# Patient Record
Sex: FEMALE | Race: WHITE | NOT HISPANIC OR LATINO | Employment: FULL TIME | URBAN - NONMETROPOLITAN AREA
[De-identification: names, ages, dates, MRNs, and addresses within clinical notes are randomized per-mention and may not be internally consistent; named-entity substitution may affect disease eponyms.]

---

## 2017-07-20 ENCOUNTER — TRANSCRIBE ORDERS (OUTPATIENT)
Dept: ADMINISTRATIVE | Facility: HOSPITAL | Age: 62
End: 2017-07-20

## 2017-07-20 DIAGNOSIS — Z85.3 HX OF BREAST CANCER: Primary | ICD-10-CM

## 2017-08-10 ENCOUNTER — HOSPITAL ENCOUNTER (OUTPATIENT)
Dept: MAMMOGRAPHY | Facility: HOSPITAL | Age: 62
Discharge: HOME | End: 2017-08-10
Attending: INTERNAL MEDICINE | Admitting: INTERNAL MEDICINE

## 2017-08-10 DIAGNOSIS — Z85.3 HX OF BREAST CANCER: ICD-10-CM

## 2017-08-10 PROCEDURE — 77063 BREAST TOMOSYNTHESIS BI: CPT | Performed by: RADIOLOGY

## 2017-08-10 PROCEDURE — 77063 BREAST TOMOSYNTHESIS BI: CPT

## 2017-08-10 PROCEDURE — 77067 SCR MAMMO BI INCL CAD: CPT | Performed by: RADIOLOGY

## 2017-08-10 PROCEDURE — G0202 SCR MAMMO BI INCL CAD: HCPCS

## 2017-08-17 ENCOUNTER — HOSPITAL ENCOUNTER (OUTPATIENT)
Dept: ULTRASOUND IMAGING | Facility: HOSPITAL | Age: 62
Discharge: HOME | End: 2017-08-17
Attending: RADIOLOGY | Admitting: RADIOLOGY

## 2017-08-17 DIAGNOSIS — R92.8 ABNORMAL MAMMOGRAM: ICD-10-CM

## 2017-08-17 PROCEDURE — 76642 ULTRASOUND BREAST LIMITED: CPT | Performed by: RADIOLOGY

## 2017-08-17 PROCEDURE — 76642 ULTRASOUND BREAST LIMITED: CPT

## 2018-11-02 ENCOUNTER — HOSPITAL ENCOUNTER (OUTPATIENT)
Dept: MAMMOGRAPHY | Facility: HOSPITAL | Age: 63
Discharge: HOME | End: 2018-11-02
Admitting: INTERNAL MEDICINE

## 2018-11-02 DIAGNOSIS — Z12.39 SCREENING BREAST EXAMINATION: ICD-10-CM

## 2018-11-02 PROCEDURE — 77063 BREAST TOMOSYNTHESIS BI: CPT | Performed by: RADIOLOGY

## 2018-11-02 PROCEDURE — 77063 BREAST TOMOSYNTHESIS BI: CPT

## 2018-11-02 PROCEDURE — 77067 SCR MAMMO BI INCL CAD: CPT

## 2018-11-02 PROCEDURE — 77067 SCR MAMMO BI INCL CAD: CPT | Performed by: RADIOLOGY

## 2018-11-07 ENCOUNTER — HOSPITAL ENCOUNTER (OUTPATIENT)
Dept: MAMMOGRAPHY | Facility: HOSPITAL | Age: 63
Discharge: HOME | End: 2018-11-07
Admitting: RADIOLOGY

## 2018-11-07 ENCOUNTER — HOSPITAL ENCOUNTER (OUTPATIENT)
Dept: GENERAL RADIOLOGY | Facility: HOSPITAL | Age: 63
Discharge: HOME | End: 2018-11-07
Attending: INTERNAL MEDICINE

## 2018-11-07 ENCOUNTER — HOSPITAL ENCOUNTER (OUTPATIENT)
Dept: BONE DENSITY | Facility: HOSPITAL | Age: 63
Discharge: HOME | End: 2018-11-07

## 2018-11-07 ENCOUNTER — TRANSCRIBE ORDERS (OUTPATIENT)
Dept: ADMINISTRATIVE | Facility: HOSPITAL | Age: 63
End: 2018-11-07

## 2018-11-07 DIAGNOSIS — Z90.710 POST HYSTERECTOMY MENOPAUSE: ICD-10-CM

## 2018-11-07 DIAGNOSIS — M25.552 LEFT HIP PAIN: Primary | ICD-10-CM

## 2018-11-07 DIAGNOSIS — M25.552 LEFT HIP PAIN: ICD-10-CM

## 2018-11-07 DIAGNOSIS — Z92.89 HX OF MAMMOGRAM: ICD-10-CM

## 2018-11-07 DIAGNOSIS — E89.40 POST HYSTERECTOMY MENOPAUSE: ICD-10-CM

## 2018-11-07 PROCEDURE — 73503 X-RAY EXAM HIP UNI 4/> VIEWS: CPT | Performed by: RADIOLOGY

## 2018-11-07 PROCEDURE — 77080 DXA BONE DENSITY AXIAL: CPT | Performed by: RADIOLOGY

## 2018-11-07 PROCEDURE — 73503 X-RAY EXAM HIP UNI 4/> VIEWS: CPT

## 2018-11-07 PROCEDURE — 77080 DXA BONE DENSITY AXIAL: CPT

## 2018-11-16 ENCOUNTER — APPOINTMENT (OUTPATIENT)
Dept: BONE DENSITY | Facility: HOSPITAL | Age: 63
End: 2018-11-16

## 2019-12-13 ENCOUNTER — HOSPITAL ENCOUNTER (OUTPATIENT)
Dept: MAMMOGRAPHY | Facility: HOSPITAL | Age: 64
Discharge: HOME | End: 2019-12-13
Admitting: INTERNAL MEDICINE

## 2019-12-13 DIAGNOSIS — Z12.31 VISIT FOR SCREENING MAMMOGRAM: ICD-10-CM

## 2019-12-13 PROCEDURE — 77067 SCR MAMMO BI INCL CAD: CPT

## 2019-12-13 PROCEDURE — 77067 SCR MAMMO BI INCL CAD: CPT | Performed by: RADIOLOGY

## 2019-12-13 PROCEDURE — 77063 BREAST TOMOSYNTHESIS BI: CPT | Performed by: RADIOLOGY

## 2019-12-13 PROCEDURE — 77063 BREAST TOMOSYNTHESIS BI: CPT

## 2020-10-02 ENCOUNTER — APPOINTMENT (OUTPATIENT)
Dept: MAMMOGRAPHY | Facility: HOSPITAL | Age: 65
End: 2020-10-02

## 2020-10-02 ENCOUNTER — HOSPITAL ENCOUNTER (OUTPATIENT)
Dept: BONE DENSITY | Facility: HOSPITAL | Age: 65
End: 2020-10-02

## 2020-12-14 ENCOUNTER — HOSPITAL ENCOUNTER (OUTPATIENT)
Dept: BONE DENSITY | Facility: HOSPITAL | Age: 65
Discharge: HOME | End: 2020-12-14

## 2020-12-14 ENCOUNTER — HOSPITAL ENCOUNTER (OUTPATIENT)
Dept: MAMMOGRAPHY | Facility: HOSPITAL | Age: 65
Discharge: HOME | End: 2020-12-14

## 2020-12-14 DIAGNOSIS — Z12.31 VISIT FOR SCREENING MAMMOGRAM: ICD-10-CM

## 2020-12-14 DIAGNOSIS — M81.0 AGE-RELATED OSTEOPOROSIS WITHOUT CURRENT PATHOLOGICAL FRACTURE: ICD-10-CM

## 2020-12-14 PROCEDURE — 77080 DXA BONE DENSITY AXIAL: CPT | Performed by: RADIOLOGY

## 2020-12-14 PROCEDURE — 77063 BREAST TOMOSYNTHESIS BI: CPT

## 2020-12-14 PROCEDURE — 77067 SCR MAMMO BI INCL CAD: CPT

## 2020-12-14 PROCEDURE — 77063 BREAST TOMOSYNTHESIS BI: CPT | Performed by: RADIOLOGY

## 2020-12-14 PROCEDURE — 77080 DXA BONE DENSITY AXIAL: CPT

## 2020-12-14 PROCEDURE — 77067 SCR MAMMO BI INCL CAD: CPT | Performed by: RADIOLOGY

## 2021-02-18 ENCOUNTER — IMMUNIZATION (OUTPATIENT)
Dept: VACCINE CLINIC | Facility: HOSPITAL | Age: 66
End: 2021-02-18

## 2021-02-18 PROCEDURE — 91300 HC SARSCOV02 VAC 30MCG/0.3ML IM: CPT | Performed by: INTERNAL MEDICINE

## 2021-02-18 PROCEDURE — 0001A: CPT | Performed by: INTERNAL MEDICINE

## 2021-03-11 ENCOUNTER — IMMUNIZATION (OUTPATIENT)
Dept: VACCINE CLINIC | Facility: HOSPITAL | Age: 66
End: 2021-03-11

## 2021-03-11 PROCEDURE — 0002A: CPT | Performed by: INTERNAL MEDICINE

## 2021-03-11 PROCEDURE — 91300 HC SARSCOV02 VAC 30MCG/0.3ML IM: CPT | Performed by: INTERNAL MEDICINE

## 2021-09-28 ENCOUNTER — IMMUNIZATION (OUTPATIENT)
Dept: VACCINE CLINIC | Facility: HOSPITAL | Age: 66
End: 2021-09-28

## 2021-09-28 PROCEDURE — 0003A: CPT | Performed by: INTERNAL MEDICINE

## 2021-09-28 PROCEDURE — 91300 HC SARSCOV02 VAC 30MCG/0.3ML IM: CPT | Performed by: INTERNAL MEDICINE

## 2022-01-20 ENCOUNTER — HOSPITAL ENCOUNTER (OUTPATIENT)
Dept: MAMMOGRAPHY | Facility: HOSPITAL | Age: 67
Discharge: HOME | End: 2022-01-20
Admitting: INTERNAL MEDICINE

## 2022-01-20 DIAGNOSIS — Z12.31 VISIT FOR SCREENING MAMMOGRAM: ICD-10-CM

## 2022-01-20 PROCEDURE — 77067 SCR MAMMO BI INCL CAD: CPT | Performed by: RADIOLOGY

## 2022-01-20 PROCEDURE — 77067 SCR MAMMO BI INCL CAD: CPT

## 2022-01-20 PROCEDURE — 77063 BREAST TOMOSYNTHESIS BI: CPT

## 2022-01-20 PROCEDURE — 77063 BREAST TOMOSYNTHESIS BI: CPT | Performed by: RADIOLOGY

## 2022-10-06 ENCOUNTER — TRANSCRIBE ORDERS (OUTPATIENT)
Dept: ADMINISTRATIVE | Facility: HOSPITAL | Age: 67
End: 2022-10-06

## 2022-10-06 DIAGNOSIS — M81.0 SENILE OSTEOPOROSIS: Primary | ICD-10-CM

## 2022-12-21 ENCOUNTER — HOSPITAL ENCOUNTER (OUTPATIENT)
Dept: BONE DENSITY | Facility: HOSPITAL | Age: 67
Discharge: HOME | End: 2022-12-21
Admitting: INTERNAL MEDICINE

## 2022-12-21 DIAGNOSIS — M81.0 SENILE OSTEOPOROSIS: ICD-10-CM

## 2022-12-21 PROCEDURE — 77080 DXA BONE DENSITY AXIAL: CPT

## 2022-12-21 PROCEDURE — 77080 DXA BONE DENSITY AXIAL: CPT | Performed by: RADIOLOGY

## 2023-01-23 ENCOUNTER — HOSPITAL ENCOUNTER (OUTPATIENT)
Dept: MAMMOGRAPHY | Facility: HOSPITAL | Age: 68
Discharge: HOME | End: 2023-01-23
Admitting: INTERNAL MEDICINE
Payer: MEDICARE

## 2023-01-23 DIAGNOSIS — Z12.31 VISIT FOR SCREENING MAMMOGRAM: ICD-10-CM

## 2023-01-23 PROCEDURE — 77067 SCR MAMMO BI INCL CAD: CPT | Performed by: RADIOLOGY

## 2023-01-23 PROCEDURE — 77063 BREAST TOMOSYNTHESIS BI: CPT

## 2023-01-23 PROCEDURE — 77063 BREAST TOMOSYNTHESIS BI: CPT | Performed by: RADIOLOGY

## 2023-01-23 PROCEDURE — 77067 SCR MAMMO BI INCL CAD: CPT

## 2023-05-24 ENCOUNTER — TRANSCRIBE ORDERS (OUTPATIENT)
Dept: ADMINISTRATIVE | Facility: HOSPITAL | Age: 68
End: 2023-05-24
Payer: MEDICARE

## 2023-05-24 ENCOUNTER — HOSPITAL ENCOUNTER (OUTPATIENT)
Dept: GENERAL RADIOLOGY | Facility: HOSPITAL | Age: 68
Discharge: HOME | End: 2023-05-24
Admitting: INTERNAL MEDICINE
Payer: MEDICARE

## 2023-05-24 DIAGNOSIS — M79.671 RIGHT FOOT PAIN: ICD-10-CM

## 2023-05-24 DIAGNOSIS — M79.671 RIGHT FOOT PAIN: Primary | ICD-10-CM

## 2023-05-24 PROCEDURE — 73630 X-RAY EXAM OF FOOT: CPT | Performed by: RADIOLOGY

## 2023-05-24 PROCEDURE — 73630 X-RAY EXAM OF FOOT: CPT

## 2023-10-09 ENCOUNTER — TRANSCRIBE ORDERS (OUTPATIENT)
Dept: ADMINISTRATIVE | Facility: HOSPITAL | Age: 68
End: 2023-10-09
Payer: MEDICARE

## 2023-10-09 ENCOUNTER — HOSPITAL ENCOUNTER (OUTPATIENT)
Dept: GENERAL RADIOLOGY | Facility: HOSPITAL | Age: 68
Discharge: HOME | End: 2023-10-09
Admitting: INTERNAL MEDICINE
Payer: MEDICARE

## 2023-10-09 DIAGNOSIS — M25.552 CHRONIC LEFT HIP PAIN: Primary | ICD-10-CM

## 2023-10-09 DIAGNOSIS — M25.552 CHRONIC LEFT HIP PAIN: ICD-10-CM

## 2023-10-09 DIAGNOSIS — G89.29 CHRONIC LEFT HIP PAIN: Primary | ICD-10-CM

## 2023-10-09 DIAGNOSIS — G89.29 CHRONIC LEFT HIP PAIN: ICD-10-CM

## 2023-10-09 PROCEDURE — 73523 X-RAY EXAM HIPS BI 5/> VIEWS: CPT

## 2024-08-20 ENCOUNTER — TRANSCRIBE ORDERS (OUTPATIENT)
Dept: ADMINISTRATIVE | Facility: HOSPITAL | Age: 69
End: 2024-08-20
Payer: MEDICARE

## 2024-08-20 DIAGNOSIS — Z12.31 BREAST CANCER SCREENING BY MAMMOGRAM: Primary | ICD-10-CM

## 2024-10-01 ENCOUNTER — HOSPITAL ENCOUNTER (OUTPATIENT)
Facility: HOSPITAL | Age: 69
Discharge: HOME | End: 2024-10-01
Admitting: INTERNAL MEDICINE
Payer: MEDICARE

## 2024-10-01 DIAGNOSIS — Z12.31 BREAST CANCER SCREENING BY MAMMOGRAM: ICD-10-CM

## 2024-10-01 PROCEDURE — 77067 SCR MAMMO BI INCL CAD: CPT

## 2024-10-01 PROCEDURE — 77063 BREAST TOMOSYNTHESIS BI: CPT

## 2024-10-03 ENCOUNTER — HOSPITAL ENCOUNTER (OUTPATIENT)
Dept: MAMMOGRAPHY | Facility: HOSPITAL | Age: 69
Discharge: HOME | End: 2024-10-03
Payer: MEDICARE

## 2024-10-03 ENCOUNTER — HOSPITAL ENCOUNTER (OUTPATIENT)
Dept: ULTRASOUND IMAGING | Facility: HOSPITAL | Age: 69
Discharge: HOME | End: 2024-10-03
Payer: MEDICARE

## 2024-10-03 DIAGNOSIS — R92.8 ABNORMAL MAMMOGRAM: ICD-10-CM

## 2024-10-03 PROCEDURE — G0279 TOMOSYNTHESIS, MAMMO: HCPCS

## 2024-10-03 PROCEDURE — 77065 DX MAMMO INCL CAD UNI: CPT

## 2024-10-03 PROCEDURE — 76642 ULTRASOUND BREAST LIMITED: CPT

## 2024-10-04 ENCOUNTER — TRANSCRIBE ORDERS (OUTPATIENT)
Dept: ADMINISTRATIVE | Facility: HOSPITAL | Age: 69
End: 2024-10-04
Payer: MEDICARE

## 2024-10-04 DIAGNOSIS — Z78.0 ASYMPTOMATIC MENOPAUSAL STATE: Primary | ICD-10-CM

## 2024-10-08 ENCOUNTER — HOSPITAL ENCOUNTER (OUTPATIENT)
Dept: MAMMOGRAPHY | Facility: HOSPITAL | Age: 69
Discharge: HOME | End: 2024-10-08
Payer: MEDICARE

## 2024-10-08 ENCOUNTER — HOSPITAL ENCOUNTER (OUTPATIENT)
Dept: ULTRASOUND IMAGING | Facility: HOSPITAL | Age: 69
Discharge: HOME | End: 2024-10-08
Payer: MEDICARE

## 2024-10-08 DIAGNOSIS — R92.8 ABNORMAL MAMMOGRAM: ICD-10-CM

## 2024-10-08 PROCEDURE — A4648 IMPLANTABLE TISSUE MARKER: HCPCS

## 2024-10-10 LAB
REF LAB TEST METHOD: NORMAL
REF LAB TEST METHOD: NORMAL

## 2024-10-15 ENCOUNTER — TELEPHONE (OUTPATIENT)
Dept: MAMMOGRAPHY | Facility: HOSPITAL | Age: 69
End: 2024-10-15
Payer: MEDICARE

## 2024-10-18 ENCOUNTER — OFFICE VISIT (OUTPATIENT)
Dept: SURGERY | Facility: CLINIC | Age: 69
End: 2024-10-18
Payer: MEDICARE

## 2024-10-18 VITALS
BODY MASS INDEX: 23.71 KG/M2 | SYSTOLIC BLOOD PRESSURE: 136 MMHG | WEIGHT: 133.8 LBS | HEIGHT: 63 IN | DIASTOLIC BLOOD PRESSURE: 72 MMHG | HEART RATE: 72 BPM

## 2024-10-18 DIAGNOSIS — C50.911 MALIGNANT NEOPLASM OF RIGHT BREAST IN FEMALE, ESTROGEN RECEPTOR POSITIVE, UNSPECIFIED SITE OF BREAST: Primary | ICD-10-CM

## 2024-10-18 DIAGNOSIS — Z17.0 MALIGNANT NEOPLASM OF RIGHT BREAST IN FEMALE, ESTROGEN RECEPTOR POSITIVE, UNSPECIFIED SITE OF BREAST: Primary | ICD-10-CM

## 2024-10-18 PROCEDURE — 76642 ULTRASOUND BREAST LIMITED: CPT | Performed by: SURGERY

## 2024-10-18 PROCEDURE — 99204 OFFICE O/P NEW MOD 45 MIN: CPT | Performed by: SURGERY

## 2024-10-18 PROCEDURE — 1159F MED LIST DOCD IN RCRD: CPT | Performed by: SURGERY

## 2024-10-18 PROCEDURE — 1160F RVW MEDS BY RX/DR IN RCRD: CPT | Performed by: SURGERY

## 2024-10-18 RX ORDER — VALACYCLOVIR HYDROCHLORIDE 1 G/1
1000 TABLET, FILM COATED ORAL 3 TIMES DAILY
COMMUNITY
Start: 2024-10-04

## 2024-10-18 RX ORDER — GLUCOSAMINE/CHONDR SU A SOD 750-600 MG
TABLET ORAL
COMMUNITY

## 2024-10-18 RX ORDER — ALENDRONATE SODIUM 70 MG/1
TABLET ORAL
COMMUNITY
Start: 2024-09-30

## 2024-10-18 RX ORDER — IBUPROFEN 800 MG/1
TABLET, FILM COATED ORAL
COMMUNITY
Start: 2024-06-24

## 2024-10-18 NOTE — PROGRESS NOTES
Subjective   Ela Loera is a 69 y.o. female here today for pathology review.    History of Present Illness  Ms. Loera was seen in the office today for her new diagnosis of right breast cancer.  The patient underwent a screening mammogram on 10/1/2024 which demonstrated a focal asymmetry in the right upper outer quadrant.  She underwent a right diagnostic mammogram and right breast ultrasound on 10/3/2024 which demonstrated a 1 cm irregular mass at 10:00 3 cm from the nipple.  A prominent right axillary lymph node was also identified.  Biopsy was performed of both the lymph node and the breast on 10/8/2024 which demonstrated a grade 2 invasive ductal carcinoma, ER positive, [place] positive, HER2 negative.  The patient has a personal history of right breast cancer treated in 1986 with lumpectomy and axillary node dissection.  Patient also states she received 36 radiation treatments.  She did not have chemotherapy.  Patient reports a family history of mother and sister with colon cancer and another sister with non-Hodgkin's lymphoma but no other family members with breast or ovarian cancer  No Known Allergies  Current Outpatient Medications   Medication Sig Dispense Refill    CRANBERRY FRUIT CONCENTRATE PO Take  by mouth.      ibuprofen (ADVIL,MOTRIN) 800 MG tablet       Lutein-Zeaxanthin 25-5 MG capsule       valACYclovir (VALTREX) 1000 MG tablet Take 1 tablet by mouth 3 (Three) Times a Day.      alendronate (FOSAMAX) 70 MG tablet  (Patient not taking: Reported on 10/18/2024)       No current facility-administered medications for this visit.     Past Medical History:   Diagnosis Date    Breast cancer 1986     Past Surgical History:   Procedure Laterality Date    BREAST LUMPECTOMY Right 10/1986    cancer    CATARACT EXTRACTION      NOSE SURGERY         Pertinent Review of Systems:  Respiratory: no shortness of breath  Cardiovascular: no chest pain  Other pertinent:      Objective   /72 (BP Location: Left  "arm)   Pulse 72   Ht 160 cm (63\")   Wt 60.7 kg (133 lb 12.8 oz)   BMI 23.70 kg/m²   Physical Exam  Well-developed well-nourished female  Neck:  No supraclavicular adenopathy  Lungs:  Respiratory effort normal. Auscultation: Clear, without wheezes, rhonchi, rales  Heart:  Regular rate and rhythm, without murmur, gallop, rub.  No pedal edema  Breasts: On visual inspection the right breast is slightly smaller than the left but otherwise there is good conformity.  Examination of the right breast demonstrates some thickening in the 10 o'clock position which may be postbiopsy change.  There is a high palpable right axillary lymph node.  Examination of the left breast demonstrates no discrete mass, skin change, or axillary adenopathy.       Procedures   Limited right breast Ultrasound close axilla    Indication: Evaluation for clip placement    Description: With use of the 12.5 MHz transducer the area of clinical concern was scanned in multiple planes.    Findings: In the 10 o'clock position the tumor and biopsy clip are identifiable in the 10 o'clock position.  High in the right axilla medial under the pectoralis major muscle and an enlarged lymph node is identified.    Impression: Tumor amenable to ultrasound localization    Recommendation:  Followup as clinically indicated  Results/Data:  Imaging: Mammogram and ultrasound reports and images from 10/8/2024, 9/3/2024)/1/24 were reviewed.  I agree with the assessment  Notes:   Lab:   Other: Pathology results were reviewed    Assessment & Plan   Clinical T1 N?  Negative ductal carcinoma, ER positive, [place] positive, HER2 negative    Plan: Given the density of the breast tissue MRI is indicated.  Given the patient's first primary was in her 30s patient was offered genetic testing, particularly if this is her second primary but given her age of 69 and the fact that she does not have any female children, she declined  Patient to return to the office post MRI for surgical " planning         Discussion/Summary: Treatment course is complicated by the patient's lumpectomy and radiation on the right for prior breast cancer.  Traditionally prior radiation excludes lumpectomy as a treatment option but in this case that the tumor is a T1 and lymph nodes are negative then she could meet the criteria to avoid radiation.  The key will be the status of the large right axillary lymph node.  My suspicion is that this will need to be removed and pending the frozen section result will determine whether or not lumpectomy versus mastectomy would need to be done as if the lymph node is positive, she would not meet the criteria to avoid radiation.    Time spent:     BMI is within normal parameters. No other follow-up for BMI required.       Future Appointments   Date Time Provider Department Center   12/23/2024  8:00 AM [facility] DEXA 1 [facility] DX [facility]         This document has been electronically signed by       October 18, 2024 11:54 EDT      Please note that portions of this note were completed with a voice recognition program.

## 2024-10-25 ENCOUNTER — PATIENT OUTREACH (OUTPATIENT)
Dept: ONCOLOGY | Facility: CLINIC | Age: 69
End: 2024-10-25
Payer: MEDICARE

## 2024-10-25 NOTE — SIGNIFICANT NOTE
Called patient on this date. The role of the Nurse Navigator was introduced to patient.  NN addressed illness, understanding, and provided clarification as needed. Time spent talking with patient, empathetic listening provided, and reassurance given. NN contact information was provided and encouraged patient to call with any questions or concerns. Pt. Verbalized understanding and is in agreement with plan of care. NN will follow and assist PRN.

## 2024-10-29 ENCOUNTER — TELEPHONE (OUTPATIENT)
Dept: SURGERY | Facility: CLINIC | Age: 69
End: 2024-10-29
Payer: MEDICARE

## 2024-10-29 NOTE — TELEPHONE ENCOUNTER
Called PCP to ask if might have copy of old pathology report. They do not their records only go back to 2013.

## 2024-10-30 ENCOUNTER — HOSPITAL ENCOUNTER (OUTPATIENT)
Dept: MRI IMAGING | Facility: HOSPITAL | Age: 69
Discharge: HOME | End: 2024-10-30
Admitting: SURGERY
Payer: MEDICARE

## 2024-10-30 DIAGNOSIS — C50.911 MALIGNANT NEOPLASM OF RIGHT BREAST IN FEMALE, ESTROGEN RECEPTOR POSITIVE, UNSPECIFIED SITE OF BREAST: ICD-10-CM

## 2024-10-30 DIAGNOSIS — Z17.0 MALIGNANT NEOPLASM OF RIGHT BREAST IN FEMALE, ESTROGEN RECEPTOR POSITIVE, UNSPECIFIED SITE OF BREAST: ICD-10-CM

## 2024-10-30 LAB — CREAT BLDA-MCNC: 0.5 MG/DL (ref 0.6–1.3)

## 2024-10-30 PROCEDURE — 77049 MRI BREAST C-+ W/CAD BI: CPT | Performed by: RADIOLOGY

## 2024-10-30 PROCEDURE — 0 GADOBENATE DIMEGLUMINE 529 MG/ML SOLUTION: Performed by: SURGERY

## 2024-10-30 PROCEDURE — A9577 INJ MULTIHANCE: HCPCS | Performed by: SURGERY

## 2024-10-30 PROCEDURE — C8906 MRI W/CONT, BREAST,  BI: HCPCS

## 2024-10-30 PROCEDURE — 82565 ASSAY OF CREATININE: CPT

## 2024-10-30 RX ADMIN — GADOBENATE DIMEGLUMINE 12 ML: 529 INJECTION, SOLUTION INTRAVENOUS at 14:41

## 2024-11-07 ENCOUNTER — PATIENT OUTREACH (OUTPATIENT)
Dept: ONCOLOGY | Facility: CLINIC | Age: 69
End: 2024-11-07
Payer: MEDICARE

## 2024-11-07 NOTE — SIGNIFICANT NOTE
NN received a call from patient to discuss her follow up and what to expect. During conversation she informed me that she went for a second opinion with Dr. Kevin Briggs in [place] but has not made any commitment to [place].    Patient still plans to follow up with Dr. Jay for results and to see what she recommends for surgical planning.

## 2024-11-15 ENCOUNTER — OFFICE VISIT (OUTPATIENT)
Dept: SURGERY | Facility: CLINIC | Age: 69
End: 2024-11-15
Payer: MEDICARE

## 2024-11-15 ENCOUNTER — PATIENT OUTREACH (OUTPATIENT)
Dept: ONCOLOGY | Facility: CLINIC | Age: 69
End: 2024-11-15
Payer: MEDICARE

## 2024-11-15 VITALS
DIASTOLIC BLOOD PRESSURE: 62 MMHG | HEART RATE: 68 BPM | WEIGHT: 132 LBS | BODY MASS INDEX: 23.39 KG/M2 | HEIGHT: 63 IN | SYSTOLIC BLOOD PRESSURE: 108 MMHG

## 2024-11-15 DIAGNOSIS — C50.911 MALIGNANT NEOPLASM OF RIGHT BREAST IN FEMALE, ESTROGEN RECEPTOR POSITIVE, UNSPECIFIED SITE OF BREAST: Primary | ICD-10-CM

## 2024-11-15 DIAGNOSIS — Z17.0 MALIGNANT NEOPLASM OF RIGHT BREAST IN FEMALE, ESTROGEN RECEPTOR POSITIVE, UNSPECIFIED SITE OF BREAST: Primary | ICD-10-CM

## 2024-11-15 PROCEDURE — 1159F MED LIST DOCD IN RCRD: CPT | Performed by: SURGERY

## 2024-11-15 PROCEDURE — 1160F RVW MEDS BY RX/DR IN RCRD: CPT | Performed by: SURGERY

## 2024-11-15 PROCEDURE — 99214 OFFICE O/P EST MOD 30 MIN: CPT | Performed by: SURGERY

## 2024-11-15 NOTE — PROGRESS NOTES
Subjective   Ela Loera is a 69 y.o. female here today for MRI review.  History of Present Illness  Ms. Loera was seen in the office today for follow-up of her new diagnosis of right breast cancer.  The patient underwent a screening mammogram on 10/1/2024 which demonstrated a focal asymmetry in the right upper outer quadrant.  She underwent a right diagnostic mammogram and right breast ultrasound on 10/3/2024 which demonstrated a 1 cm irregular mass at 10:00 3 cm from the nipple.  A prominent right axillary lymph node was also identified.  Biopsy was performed of both the lymph node and the breast on 10/8/2024 which demonstrated a grade 2 invasive ductal carcinoma, ER positive, [place] positive, HER2 negative.  The patient has a personal history of right breast cancer treated in 1986 with lumpectomy and axillary node dissection.  Patient also states she received 36 radiation treatments.  She did not have chemotherapy.  Patient reports a family history of mother and sister with colon cancer and another sister with non-Hodgkin's lymphoma but no other family members with breast or ovarian cancer.  Since the patient's last visit she underwent breast MRI on 10/30/2024 and genetic testing.  Genetic testing was negative.  MRI demonstrated the tumor in the right breast but no other suspicious findings.  Patient denies any changes in her breast examination or breast history since her last visit of 10/18/2024  No Known Allergies    Current Outpatient Medications   Medication Sig Dispense Refill    alendronate (FOSAMAX) 70 MG tablet       CRANBERRY FRUIT CONCENTRATE PO Take  by mouth.      ibuprofen (ADVIL,MOTRIN) 800 MG tablet       Lutein-Zeaxanthin 25-5 MG capsule       valACYclovir (VALTREX) 1000 MG tablet Take 1 tablet by mouth 3 (Three) Times a Day.       No current facility-administered medications for this visit.     Past Medical History:   Diagnosis Date    Breast cancer 1986     Past Surgical History:  "  Procedure Laterality Date    BREAST LUMPECTOMY Right 10/1986    cancer    CATARACT EXTRACTION      NOSE SURGERY         Pertinent Review of Systems  Negative for chest pain or shortness of breath    Objective   /62 (BP Location: Left arm)   Pulse 68   Ht 160 cm (63\")   Wt 59.9 kg (132 lb)   BMI 23.38 kg/m²    Physical Exam  Well-developed well-nourished female  Neck:  No supraclavicular adenopathy  Lungs:  Respiratory effort normal. Auscultation: Clear, without wheezes, rhonchi, rales  Heart:  Regular rate and rhythm, without murmur, gallop, rub.  No pedal edema  Breasts: On visual inspection the right breast is slightly smaller than the left but otherwise there is good conformity.  Examination of the right breast demonstrates some thickening in the 10 o'clock position which may be postbiopsy change.  There is a high palpable right axillary lymph node.  Examination of the left breast demonstrates no discrete mass, skin change, or axillary adenopathy.   Procedures     Results/Data:  Imaging: MRI reports and images were reviewed.  There is actually a second area of enhancement identified on the MRI which was not discussed in the report.  I reached out to Dr. Martinez who reviewed the imaging and stated that the additional area was consistent with a fibroadenoma and did have a mammogram correlate.  Notes:   Lab:   Other: Genetic testing results were reviewed.    Assessment & Plan   Clinical T1 N?  Negative ductal carcinoma, ER positive, [place] positive, HER2 negative     Proceed with biopsy of right axillary lymph node with frozen section with possible right lumpectomy versus mastectomy.  See discussion       Discussion/Summary: This is a fairly unusual situation and require some critical thinking.  Discussion was with patient,  and breast navigator Steph Jones.  The monge to the patient's management is whether or not the axillary lymph node is positive for malignancy.  If the axillary lymph node is " negative for malignancy 1 could argue the point that the patient would meet the criteria to avoid radiation and therefore her prior history of right breast cancer would not mandate a mastectomy.  However if the lymph node is positive then patient would not meet the criteria to forego radiation.  As she has had prior radiation and this cannot be repeated she would require mastectomy.  Recommendations for treatment were discussed with the patient and  who voiced understanding of the procedure and understand that the decision for lumpectomy versus mastectomy will be based on the frozen section diagnosis of the lymph node.    Time spent: 55 minutes    BMI is within normal parameters. No other follow-up for BMI required.       [facility]    This document has been electronically signed by        November 15, 2024 09:10 EST    Please note that portions of this note were completed with a voice recognition program.

## 2024-11-15 NOTE — SIGNIFICANT NOTE
Met with patient during a follow up with Dr. Jay. Results from MRI were given, recommendations were given, surgery date picked for December 3rd for Lumpectomy vs Mastectomy depending on LN status. NN contact information was provided and encouraged patient to call with any questions or concerns. Pt. Verbalized understanding and is in agreement with plan of care. NN will follow and assist PRN

## 2024-11-16 NOTE — H&P
Subjective  Ela Loera is a 69 y.o. female here today for MRI review.  History of Present Illness  Ms. Loera was seen in the office today for follow-up of her new diagnosis of right breast cancer.  The patient underwent a screening mammogram on 10/1/2024 which demonstrated a focal asymmetry in the right upper outer quadrant.  She underwent a right diagnostic mammogram and right breast ultrasound on 10/3/2024 which demonstrated a 1 cm irregular mass at 10:00 3 cm from the nipple.  A prominent right axillary lymph node was also identified.  Biopsy was performed of both the lymph node and the breast on 10/8/2024 which demonstrated a grade 2 invasive ductal carcinoma, ER positive, [place] positive, HER2 negative.  The patient has a personal history of right breast cancer treated in 1986 with lumpectomy and axillary node dissection.  Patient also states she received 36 radiation treatments.  She did not have chemotherapy.  Patient reports a family history of mother and sister with colon cancer and another sister with non-Hodgkin's lymphoma but no other family members with breast or ovarian cancer.  Since the patient's last visit she underwent breast MRI on 10/30/2024 and genetic testing.  Genetic testing was negative.  MRI demonstrated the tumor in the right breast but no other suspicious findings.  Patient denies any changes in her breast examination or breast history since her last visit of 10/18/2024  No Known Allergies    Current Outpatient Medications   Medication Sig Dispense Refill    alendronate (FOSAMAX) 70 MG tablet       CRANBERRY FRUIT CONCENTRATE PO Take  by mouth.      ibuprofen (ADVIL,MOTRIN) 800 MG tablet       Lutein-Zeaxanthin 25-5 MG capsule       valACYclovir (VALTREX) 1000 MG tablet Take 1 tablet by mouth 3 (Three) Times a Day.       No current facility-administered medications for this visit.     Past Medical History:   Diagnosis Date    Breast cancer 1986     Past Surgical History:   Procedure  "Laterality Date    BREAST LUMPECTOMY Right 10/1986    cancer    CATARACT EXTRACTION      NOSE SURGERY         Pertinent Review of Systems  Negative for chest pain or shortness of breath    Objective  /62 (BP Location: Left arm)   Pulse 68   Ht 160 cm (63\")   Wt 59.9 kg (132 lb)   BMI 23.38 kg/m²    Physical Exam  Well-developed well-nourished female  Neck:  No supraclavicular adenopathy  Lungs:  Respiratory effort normal. Auscultation: Clear, without wheezes, rhonchi, rales  Heart:  Regular rate and rhythm, without murmur, gallop, rub.  No pedal edema  Breasts: On visual inspection the right breast is slightly smaller than the left but otherwise there is good conformity.  Examination of the right breast demonstrates some thickening in the 10 o'clock position which may be postbiopsy change.  There is a high palpable right axillary lymph node.  Examination of the left breast demonstrates no discrete mass, skin change, or axillary adenopathy.   Procedures     Results/Data:  Imaging: MRI reports and images were reviewed.  There is actually a second area of enhancement identified on the MRI which was not discussed in the report.  I reached out to Dr. Martinez who reviewed the imaging and stated that the additional area was consistent with a fibroadenoma and did have a mammogram correlate.  Notes:   Lab:   Other: Genetic testing results were reviewed.    Assessment & Plan  Clinical T1 N?  Negative ductal carcinoma, ER positive, [place] positive, HER2 negative     Proceed with biopsy of right axillary lymph node with frozen section with possible right lumpectomy versus mastectomy.  See discussion       Discussion/Summary: This is a fairly unusual situation and require some critical thinking.  Discussion was with patient,  and breast navigator Steph Jones.  The monge to the patient's management is whether or not the axillary lymph node is positive for malignancy.  If the axillary lymph node is negative for " malignancy 1 could argue the point that the patient would meet the criteria to avoid radiation and therefore her prior history of right breast cancer would not mandate a mastectomy.  However if the lymph node is positive then patient would not meet the criteria to forego radiation.  As she has had prior radiation and this cannot be repeated she would require mastectomy.  Recommendations for treatment were discussed with the patient and  who voiced understanding of the procedure and understand that the decision for lumpectomy versus mastectomy will be based on the frozen section diagnosis of the lymph node.    Time spent: 55 minutes    BMI is within normal parameters. No other follow-up for BMI required.       [facility]    This document has been electronically signed by        November 15, 2024 09:10 EST    Please note that portions of this note were completed with a voice recognition program.  This document has been electronically signed by Jenni NEWMAN MD on November 16, 2024 18:20 EST

## 2024-11-16 NOTE — H&P (VIEW-ONLY)
Subjective  Ela Loera is a 69 y.o. female here today for MRI review.  History of Present Illness  Ms. Loera was seen in the office today for follow-up of her new diagnosis of right breast cancer.  The patient underwent a screening mammogram on 10/1/2024 which demonstrated a focal asymmetry in the right upper outer quadrant.  She underwent a right diagnostic mammogram and right breast ultrasound on 10/3/2024 which demonstrated a 1 cm irregular mass at 10:00 3 cm from the nipple.  A prominent right axillary lymph node was also identified.  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  The patient has a personal history of right breast cancer treated in 1986 with lumpectomy and axillary node dissection.  Patient also states she received 36 radiation treatments.  She did not have chemotherapy.  Patient reports a family history of mother and sister with colon cancer and another sister with non-Hodgkin's lymphoma but no other family members with breast or ovarian cancer.  Since the patient's last visit she underwent breast MRI on 10/30/2024 and genetic testing.  Genetic testing was negative.  MRI demonstrated the tumor in the right breast but no other suspicious findings.  Patient denies any changes in her breast examination or breast history since her last visit of 10/18/2024  No Known Allergies    Current Outpatient Medications   Medication Sig Dispense Refill    alendronate (FOSAMAX) 70 MG tablet       CRANBERRY FRUIT CONCENTRATE PO Take  by mouth.      ibuprofen (ADVIL,MOTRIN) 800 MG tablet       Lutein-Zeaxanthin 25-5 MG capsule       valACYclovir (VALTREX) 1000 MG tablet Take 1 tablet by mouth 3 (Three) Times a Day.       No current facility-administered medications for this visit.     Past Medical History:   Diagnosis Date    Breast cancer 1986     Past Surgical History:   Procedure  "Laterality Date    BREAST LUMPECTOMY Right 10/1986    cancer    CATARACT EXTRACTION      NOSE SURGERY         Pertinent Review of Systems  Negative for chest pain or shortness of breath    Objective  /62 (BP Location: Left arm)   Pulse 68   Ht 160 cm (63\")   Wt 59.9 kg (132 lb)   BMI 23.38 kg/m²    Physical Exam  Well-developed well-nourished female  Neck:  No supraclavicular adenopathy  Lungs:  Respiratory effort normal. Auscultation: Clear, without wheezes, rhonchi, rales  Heart:  Regular rate and rhythm, without murmur, gallop, rub.  No pedal edema  Breasts: On visual inspection the right breast is slightly smaller than the left but otherwise there is good conformity.  Examination of the right breast demonstrates some thickening in the 10 o'clock position which may be postbiopsy change.  There is a high palpable right axillary lymph node.  Examination of the left breast demonstrates no discrete mass, skin change, or axillary adenopathy.   Procedures     Results/Data:  Imaging: MRI reports and images were reviewed.  There is actually a second area of enhancement identified on the MRI which was not discussed in the report.  I reached out to Dr. Martinez who reviewed the imaging and stated that the additional area was consistent with a fibroadenoma and did have a mammogram correlate.  Notes:   Lab:   Other: Genetic testing results were reviewed.    Assessment & Plan  Clinical T1 N?  Negative ductal carcinoma, ER positive, [place] positive, HER2 negative     Proceed with biopsy of right axillary lymph node with frozen section with possible right lumpectomy versus mastectomy.  See discussion       Discussion/Summary: This is a fairly unusual situation and require some critical thinking.  Discussion was with patient,  and breast navigator Steph Jones.  The monge to the patient's management is whether or not the axillary lymph node is positive for malignancy.  If the axillary lymph node is negative for " malignancy 1 could argue the point that the patient would meet the criteria to avoid radiation and therefore her prior history of right breast cancer would not mandate a mastectomy.  However if the lymph node is positive then patient would not meet the criteria to forego radiation.  As she has had prior radiation and this cannot be repeated she would require mastectomy.  Recommendations for treatment were discussed with the patient and  who voiced understanding of the procedure and understand that the decision for lumpectomy versus mastectomy will be based on the frozen section diagnosis of the lymph node.    Time spent: 55 minutes    BMI is within normal parameters. No other follow-up for BMI required.       @[facility]    This document has been electronically signed by        November 15, 2024 09:10 EST    Please note that portions of this note were completed with a voice recognition program.  This document has been electronically signed by Jenni NEWMAN MD on November 16, 2024 18:20 EST

## 2024-11-26 NOTE — DISCHARGE INSTRUCTIONS
Please discontinue all blood thinners and anticoagulants (except aspirin) prior to surgery as per your surgeon and cardiologist instructions.  Aspirin may be continued up to the day prior to surgery.    HOLD all diabetic medications the morning of surgery as order by physician.    Please follow instructions on use of prep cloths provided by nurse. Return instruction sheet to pre-op nurse on day of surgery.    Arrival time for surgery on  12/3/24 will be given to you by Dr. Jay's  Office.    A RESPONSIBLE PERSON MUST REMAIN IN THE WAITING ROOM DURING YOUR PROCEDURE AND A RESPONSIBLE  MUST BE AVAILABLE UPON YOUR DISCHARGE.    General Instructions:  Do NOT eat or drink after midnight 12/2/24 which includes water, mints, or gum.  You may brush your teeth. Dental appliances that are removable must be taken out day of surgery.  Do NOT smoke, chew tobacco, or drink alcohol within 24 hours prior to surgery.  Bring medications in original bottles, any inhalers and if applicable your C-PAP/BI-PAP machine  Bring any papers given to you in the doctor’s office  Wear clean, comfortable clothes and socks  Do NOT wear contact lenses or make-up or dark nail polish.  Bring a case for your glasses if applicable.  Bring crutches or walker if applicable  Leave all other valuables and jewelry at home  If you were given a blood bank armband, continue to wear it until discharged.    Preventing a Surgical Site Infection:  Shower the night before surgery (unless instructed otherwise) using a fresh bar of anti-bacterial soap (such as Dial) and clean washcloth.  Dry with a clean towel and dress in clean clothing.  For 2 to 3 days before surgery, avoid shaving with a razor near where you will have surgery because the razor can irritate skin and make it easier to develop an infection.  Ask your surgeon if you will be receiving antibiotics prior to surgery.  Make sure you, your family, and all healthcare providers clean their hands with  soap and water or an alcohol-based hand  before caring for you or your wound.  If at all possible, quit smoking as many days before surgery as you can.    Day of Surgery:  Upon arrival, a pre-op nurse and anesthesiologist will review your health history, obtain vital signs, and answer questions you may have.  The only belongings needed at this time will be your home medications and if applicable you C-PAP/BI-PAP machine.  If you are staying overnight, your family can leave the rest of your belongings in the car and bring them to your room later.  A pre-op nurse will start an IV and you may receive medication in preparation for surgery.  Due to patient privacy and limited space, only one member of your family will be able to accompany you in the pre-op area.  While you are in surgery your family should notify the waiting room  if they leave the waiting room area and provide a contact number.  Please be aware that surgery does come with discomfort.  We want to make every effort to control your discomfort so please discuss any uncontrolled symptoms with your nurse.  Your doctor will most likely have prescribed pain medications.  If you are going home after surgery you will receive individualized written care instructions before being discharged.  A responsible adult must drive you to and from the hospital on the day of surgery and stay with you for 24 hours.  If you are staying overnight following surgery, you will be transported to your hospital room following the recovery period.

## 2024-11-27 ENCOUNTER — PRE-ADMISSION TESTING (OUTPATIENT)
Dept: PREADMISSION TESTING | Facility: HOSPITAL | Age: 69
End: 2024-11-27
Payer: MEDICARE

## 2024-11-27 DIAGNOSIS — C50.911 MALIGNANT NEOPLASM OF RIGHT BREAST IN FEMALE, ESTROGEN RECEPTOR POSITIVE, UNSPECIFIED SITE OF BREAST: ICD-10-CM

## 2024-11-27 DIAGNOSIS — Z17.0 MALIGNANT NEOPLASM OF RIGHT BREAST IN FEMALE, ESTROGEN RECEPTOR POSITIVE, UNSPECIFIED SITE OF BREAST: ICD-10-CM

## 2024-11-27 LAB
ANION GAP SERPL CALCULATED.3IONS-SCNC: 10.2 MMOL/L (ref 5–15)
BUN SERPL-MCNC: 16 MG/DL (ref 8–23)
BUN/CREAT SERPL: 18.6 (ref 7–25)
CALCIUM SPEC-SCNC: 8.7 MG/DL (ref 8.6–10.5)
CHLORIDE SERPL-SCNC: 106 MMOL/L (ref 98–107)
CO2 SERPL-SCNC: 24.8 MMOL/L (ref 22–29)
CREAT SERPL-MCNC: 0.86 MG/DL (ref 0.57–1)
DEPRECATED RDW RBC AUTO: 42.2 FL (ref 37–54)
EGFRCR SERPLBLD CKD-EPI 2021: 72.3 ML/MIN/1.73
ERYTHROCYTE [DISTWIDTH] IN BLOOD BY AUTOMATED COUNT: 13.3 % (ref 12.3–15.4)
GLUCOSE SERPL-MCNC: 77 MG/DL (ref 65–99)
HCT VFR BLD AUTO: 36.8 % (ref 34–46.6)
HGB BLD-MCNC: 11.5 G/DL (ref 12–15.9)
MCH RBC QN AUTO: 27.3 PG (ref 26.6–33)
MCHC RBC AUTO-ENTMCNC: 31.3 G/DL (ref 31.5–35.7)
MCV RBC AUTO: 87.2 FL (ref 79–97)
PLATELET # BLD AUTO: 167 10*3/MM3 (ref 140–450)
PMV BLD AUTO: 11.2 FL (ref 6–12)
POTASSIUM SERPL-SCNC: 4.1 MMOL/L (ref 3.5–5.2)
QT INTERVAL: 418 MS
QTC INTERVAL: 434 MS
RBC # BLD AUTO: 4.22 10*6/MM3 (ref 3.77–5.28)
SODIUM SERPL-SCNC: 141 MMOL/L (ref 136–145)
WBC NRBC COR # BLD AUTO: 4.33 10*3/MM3 (ref 3.4–10.8)

## 2024-11-27 PROCEDURE — 85027 COMPLETE CBC AUTOMATED: CPT

## 2024-11-27 PROCEDURE — 80048 BASIC METABOLIC PNL TOTAL CA: CPT

## 2024-11-27 PROCEDURE — 36415 COLL VENOUS BLD VENIPUNCTURE: CPT

## 2024-11-27 PROCEDURE — 93005 ELECTROCARDIOGRAM TRACING: CPT

## 2024-12-02 ENCOUNTER — TELEPHONE (OUTPATIENT)
Dept: SURGERY | Facility: CLINIC | Age: 69
End: 2024-12-02
Payer: MEDICARE

## 2024-12-02 DIAGNOSIS — Z17.0 MALIGNANT NEOPLASM OF RIGHT BREAST IN FEMALE, ESTROGEN RECEPTOR POSITIVE, UNSPECIFIED SITE OF BREAST: Primary | ICD-10-CM

## 2024-12-02 DIAGNOSIS — C50.911 MALIGNANT NEOPLASM OF RIGHT BREAST IN FEMALE, ESTROGEN RECEPTOR POSITIVE, UNSPECIFIED SITE OF BREAST: Primary | ICD-10-CM

## 2024-12-03 ENCOUNTER — ANESTHESIA EVENT (OUTPATIENT)
Dept: PERIOP | Facility: HOSPITAL | Age: 69
End: 2024-12-03
Payer: MEDICARE

## 2024-12-03 ENCOUNTER — HOSPITAL ENCOUNTER (OUTPATIENT)
Facility: HOSPITAL | Age: 69
Setting detail: HOSPITAL OUTPATIENT SURGERY
Discharge: HOME | End: 2024-12-03
Attending: SURGERY | Admitting: SURGERY
Payer: MEDICARE

## 2024-12-03 ENCOUNTER — APPOINTMENT (OUTPATIENT)
Dept: MAMMOGRAPHY | Facility: HOSPITAL | Age: 69
End: 2024-12-03
Payer: MEDICARE

## 2024-12-03 ENCOUNTER — ANESTHESIA (OUTPATIENT)
Dept: PERIOP | Facility: HOSPITAL | Age: 69
End: 2024-12-03
Payer: MEDICARE

## 2024-12-03 VITALS
HEIGHT: 63 IN | BODY MASS INDEX: 22.82 KG/M2 | DIASTOLIC BLOOD PRESSURE: 74 MMHG | RESPIRATION RATE: 16 BRPM | TEMPERATURE: 98 F | SYSTOLIC BLOOD PRESSURE: 108 MMHG | OXYGEN SATURATION: 97 % | WEIGHT: 128.8 LBS | HEART RATE: 74 BPM

## 2024-12-03 DIAGNOSIS — Z17.0 MALIGNANT NEOPLASM OF LOWER-OUTER QUADRANT OF RIGHT BREAST OF FEMALE, ESTROGEN RECEPTOR POSITIVE: Primary | ICD-10-CM

## 2024-12-03 DIAGNOSIS — Z17.0 MALIGNANT NEOPLASM OF RIGHT BREAST IN FEMALE, ESTROGEN RECEPTOR POSITIVE, UNSPECIFIED SITE OF BREAST: ICD-10-CM

## 2024-12-03 DIAGNOSIS — C50.911 MALIGNANT NEOPLASM OF RIGHT BREAST IN FEMALE, ESTROGEN RECEPTOR POSITIVE, UNSPECIFIED SITE OF BREAST: ICD-10-CM

## 2024-12-03 DIAGNOSIS — C50.511 MALIGNANT NEOPLASM OF LOWER-OUTER QUADRANT OF RIGHT BREAST OF FEMALE, ESTROGEN RECEPTOR POSITIVE: Primary | ICD-10-CM

## 2024-12-03 PROCEDURE — 25010000002 BUPIVACAINE 0.5 % SOLUTION: Performed by: SURGERY

## 2024-12-03 PROCEDURE — 19301 PARTIAL MASTECTOMY: CPT | Performed by: SURGERY

## 2024-12-03 PROCEDURE — 38525 BIOPSY/REMOVAL LYMPH NODES: CPT | Performed by: SURGERY

## 2024-12-03 PROCEDURE — 25010000002 FENTANYL CITRATE (PF) 50 MCG/ML SOLUTION: Performed by: NURSE ANESTHETIST, CERTIFIED REGISTERED

## 2024-12-03 PROCEDURE — 25010000002 HYDROMORPHONE PER 4 MG: Performed by: NURSE ANESTHETIST, CERTIFIED REGISTERED

## 2024-12-03 PROCEDURE — 25010000002 PROPOFOL 200 MG/20ML EMULSION: Performed by: NURSE ANESTHETIST, CERTIFIED REGISTERED

## 2024-12-03 PROCEDURE — L8000 MASTECTOMY BRA: HCPCS | Performed by: SURGERY

## 2024-12-03 PROCEDURE — 25010000002 CEFAZOLIN PER 500 MG: Performed by: SURGERY

## 2024-12-03 PROCEDURE — 25010000002 ONDANSETRON PER 1 MG: Performed by: NURSE ANESTHETIST, CERTIFIED REGISTERED

## 2024-12-03 RX ORDER — BUPIVACAINE HYDROCHLORIDE 5 MG/ML
INJECTION, SOLUTION PERINEURAL AS NEEDED
Status: DISCONTINUED | OUTPATIENT
Start: 2024-12-03 | End: 2024-12-03 | Stop reason: HOSPADM

## 2024-12-03 RX ORDER — MAGNESIUM HYDROXIDE 1200 MG/15ML
LIQUID ORAL AS NEEDED
Status: DISCONTINUED | OUTPATIENT
Start: 2024-12-03 | End: 2024-12-03 | Stop reason: HOSPADM

## 2024-12-03 RX ORDER — FAMOTIDINE 10 MG/ML
INJECTION, SOLUTION INTRAVENOUS AS NEEDED
Status: DISCONTINUED | OUTPATIENT
Start: 2024-12-03 | End: 2024-12-03 | Stop reason: SURG

## 2024-12-03 RX ORDER — FENTANYL CITRATE 50 UG/ML
INJECTION, SOLUTION INTRAMUSCULAR; INTRAVENOUS AS NEEDED
Status: DISCONTINUED | OUTPATIENT
Start: 2024-12-03 | End: 2024-12-03 | Stop reason: SURG

## 2024-12-03 RX ORDER — MIDAZOLAM HYDROCHLORIDE 1 MG/ML
0.5 INJECTION, SOLUTION INTRAMUSCULAR; INTRAVENOUS
Status: DISCONTINUED | OUTPATIENT
Start: 2024-12-03 | End: 2024-12-03 | Stop reason: HOSPADM

## 2024-12-03 RX ORDER — OXYCODONE AND ACETAMINOPHEN 5; 325 MG/1; MG/1
1 TABLET ORAL ONCE AS NEEDED
Status: COMPLETED | OUTPATIENT
Start: 2024-12-03 | End: 2024-12-03

## 2024-12-03 RX ORDER — HYDROMORPHONE HYDROCHLORIDE 1 MG/ML
0.5 INJECTION, SOLUTION INTRAMUSCULAR; INTRAVENOUS; SUBCUTANEOUS
Status: DISCONTINUED | OUTPATIENT
Start: 2024-12-03 | End: 2024-12-03 | Stop reason: HOSPADM

## 2024-12-03 RX ORDER — MEPERIDINE HYDROCHLORIDE 25 MG/ML
12.5 INJECTION INTRAMUSCULAR; INTRAVENOUS; SUBCUTANEOUS
Status: DISCONTINUED | OUTPATIENT
Start: 2024-12-03 | End: 2024-12-03 | Stop reason: HOSPADM

## 2024-12-03 RX ORDER — SODIUM CHLORIDE 0.9 % (FLUSH) 0.9 %
10 SYRINGE (ML) INJECTION AS NEEDED
Status: DISCONTINUED | OUTPATIENT
Start: 2024-12-03 | End: 2024-12-03 | Stop reason: HOSPADM

## 2024-12-03 RX ORDER — PROPOFOL 10 MG/ML
INJECTION, EMULSION INTRAVENOUS AS NEEDED
Status: DISCONTINUED | OUTPATIENT
Start: 2024-12-03 | End: 2024-12-03 | Stop reason: SURG

## 2024-12-03 RX ORDER — ONDANSETRON 2 MG/ML
INJECTION INTRAMUSCULAR; INTRAVENOUS AS NEEDED
Status: DISCONTINUED | OUTPATIENT
Start: 2024-12-03 | End: 2024-12-03 | Stop reason: SURG

## 2024-12-03 RX ORDER — HYDROCODONE BITARTRATE AND ACETAMINOPHEN 10; 325 MG/1; MG/1
1 TABLET ORAL 4 TIMES DAILY PRN
Qty: 12 TABLET | Refills: 0 | Status: SHIPPED | OUTPATIENT
Start: 2024-12-03 | End: 2024-12-12

## 2024-12-03 RX ORDER — FENTANYL CITRATE 50 UG/ML
50 INJECTION, SOLUTION INTRAMUSCULAR; INTRAVENOUS
Status: DISCONTINUED | OUTPATIENT
Start: 2024-12-03 | End: 2024-12-03 | Stop reason: HOSPADM

## 2024-12-03 RX ORDER — ONDANSETRON 2 MG/ML
4 INJECTION INTRAMUSCULAR; INTRAVENOUS AS NEEDED
Status: DISCONTINUED | OUTPATIENT
Start: 2024-12-03 | End: 2024-12-03 | Stop reason: HOSPADM

## 2024-12-03 RX ORDER — SODIUM CHLORIDE 0.9 % (FLUSH) 0.9 %
10 SYRINGE (ML) INJECTION EVERY 12 HOURS SCHEDULED
Status: DISCONTINUED | OUTPATIENT
Start: 2024-12-03 | End: 2024-12-03 | Stop reason: HOSPADM

## 2024-12-03 RX ORDER — IPRATROPIUM BROMIDE AND ALBUTEROL SULFATE 2.5; .5 MG/3ML; MG/3ML
3 SOLUTION RESPIRATORY (INHALATION) ONCE AS NEEDED
Status: DISCONTINUED | OUTPATIENT
Start: 2024-12-03 | End: 2024-12-03 | Stop reason: HOSPADM

## 2024-12-03 RX ADMIN — FENTANYL CITRATE 25 MCG: 50 INJECTION INTRAMUSCULAR; INTRAVENOUS at 11:40

## 2024-12-03 RX ADMIN — HYDROMORPHONE HYDROCHLORIDE 0.5 MG: 1 INJECTION, SOLUTION INTRAMUSCULAR; INTRAVENOUS; SUBCUTANEOUS at 12:54

## 2024-12-03 RX ADMIN — PROPOFOL 120 MG: 10 INJECTION, EMULSION INTRAVENOUS at 10:30

## 2024-12-03 RX ADMIN — CEFAZOLIN 2 G: 2 INJECTION, POWDER, FOR SOLUTION INTRAMUSCULAR; INTRAVENOUS at 10:25

## 2024-12-03 RX ADMIN — FENTANYL CITRATE 50 MCG: 50 INJECTION INTRAMUSCULAR; INTRAVENOUS at 10:48

## 2024-12-03 RX ADMIN — FAMOTIDINE 20 MG: 10 INJECTION, SOLUTION INTRAVENOUS at 10:25

## 2024-12-03 RX ADMIN — ONDANSETRON 4 MG: 2 INJECTION INTRAMUSCULAR; INTRAVENOUS at 10:25

## 2024-12-03 RX ADMIN — OXYCODONE HYDROCHLORIDE AND ACETAMINOPHEN 1 TABLET: 5; 325 TABLET ORAL at 12:55

## 2024-12-03 RX ADMIN — FENTANYL CITRATE 25 MCG: 50 INJECTION INTRAMUSCULAR; INTRAVENOUS at 10:25

## 2024-12-03 NOTE — ANESTHESIA PROCEDURE NOTES
Airway  Urgency: elective    Date/Time: 12/3/2024 10:31 AM  Airway not difficult    General Information and Staff    Patient location during procedure: OR  CRNA/CAA: Beulah Padron CRNA    Indications and Patient Condition  Indications for airway management: airway protection    Preoxygenated: yes  MILS maintained throughout  Mask difficulty assessment: 0 - not attempted    Final Airway Details  Final airway type: supraglottic airway      Successful airway: unique  Size 4     Number of attempts at approach: 1  Assessment: lips, teeth, and gum same as pre-op

## 2024-12-03 NOTE — OP NOTE
Right axillary lymph node biopsy with frozen section  Right lumpectomy with ultrasound localization right breast carcinoma    Pre-op: Right breast carcinoma    Post-op:  Same    Surgeon:  Jenni Jay M.D., F.A.C.S.    Assistant: Patricia    Anesthesia:  general      Indications: Patient with prior history of right breast carcinoma 20 years ago treated with lumpectomy and axillary node dissection and radiation.  Now with new primary.  Prominent right axillary lymph nodes negative for malignancy on FNA please refer to discussion in history and physical for further details as to indication       Procedure Details   After obtaining informed consent and receiving preoperative antibiotics and with venous compression boots in place, the patient was taken to the operating room and placed under anesthesia.  The right breast and axilla were prepped and draped in a sterile fashion.  Using ultrasound localization of the right axillary lymph node was identified in the right axilla.  A small 1 cm incision directly over this lymph node was made and carried down through the dermis and into the subcu.  The patient had very little axillary tissue left as she did have a complete axillary dissection and she was also thin.  The node was palpable through the small incision and dissected free from the surrounding tissue.  This was sent to pathology for frozen section which returned negative for malignancy.  The incision was closed in a 2 layer closure of interrupted 3-0 Vicryl subdermal sutures, followed by 4-0 Monocryl.  It had been discussed preop at that if the lymph node was negative suggesting it was reasonable for patient to forego radiation and then lumpectomy would be performed.  Ultrasound was then utilized to identify the tumor.  The radiology reports stated that the tumor was at 10:00 3 cm from the nipple.  There is no obvious tumor identifiable in this area and MRI reports and images were reviewed which demonstrated the tumor  to be more inferior.  Scanning further with the ultrasound the tumor was identified deep in the breast tissue at 8:00 approximately 6 cm from the nipple.  A radial incision was made directly over the tumor and carried down into the breast tissue dissection was carried out around the tumor location down to the pectoralis fascia.  As the tumor was removed part of the gel from the biopsy clip was identified in the upper outer aspect of the specimen.  After the main lumpectomy specimen was removed a rim of tissue overlying the site where the tumor was closest to the edge was taken.  This was actually attached to the tumor specimen with a stitch so that appropriate margins  could be delineated.  Specimen was inked for orientation.  The wound was irrigated with saline and closed in a several layer closure of deep 0 Vicryl sutures, followed by 3-0 Vicryl subdermal sutures and 4-0 Monocryl.  Dressings were placed.  Patient tolerated the procedure well and was taken to the recovery room in stable condition.    Findings:    None of the epic guidelines suggested was the appropriate indication for documentation of this cancer treatment.  Again please refer to the history and physical for more detailed description.           Estimated Blood Loss:  Minimal    Blood Administered: none           Drains: none           Specimens:   ID Type Source Tests Collected by Time   A :  Tissue Axilla, Right TISSUE EXAM, [facility]) Jenni Jay MD 12/3/2024 1059   B :  Tissue Breast, Right TISSUE EXAM, [facility]) Jenni Jay MD 12/3/2024 1150        Grafts and Implants: No       Complications:  none           Disposition: PACU - hemodynamically stable.           Condition: stable

## 2024-12-03 NOTE — ANESTHESIA PREPROCEDURE EVALUATION
Anesthesia Evaluation     Patient summary reviewed and Nursing notes reviewed   no history of anesthetic complications:   NPO Solid Status: > 8 hours             Airway   Mallampati: II  TM distance: >3 FB  Neck ROM: full  Dental - normal exam     Pulmonary - negative pulmonary ROS    breath sounds clear to auscultation  Cardiovascular   Exercise tolerance: good (4-7 METS)    ECG reviewed  Rhythm: regular  Rate: normal        Neuro/Psych- negative ROS  GI/Hepatic/Renal/Endo - negative ROS     Musculoskeletal (-) negative ROS    Abdominal     Abdomen: soft.   Substance History - negative use     OB/GYN negative ob/gyn ROS         Other      history of cancer (breast) active    ROS/Med Hx Other: osteopenia                Anesthesia Plan    ASA 3     general     intravenous induction     Anesthetic plan, risks, benefits, and alternatives have been provided, discussed and informed consent has been obtained with: patient.  Pre-procedure education provided  Use of blood products discussed with  Consented to blood products.    Plan discussed with CRNA.    CODE STATUS:

## 2024-12-03 NOTE — ANESTHESIA POSTPROCEDURE EVALUATION
Patient: Ela Loera    Procedure Summary       Date: 12/03/24 Room / Location:  [facility] 01 /  [facility]    Anesthesia Start: 1025 Anesthesia Stop: 1213    Procedures:       AXILLARY LYMPH NODE BIOPSY/EXCISION with frozen section. (Right)      right breast lumpectomy. (Right: Breast)      INTRAOPERATIVE ULTRASOUND (Right) Diagnosis:       Malignant neoplasm of right breast in female, estrogen receptor positive, unspecified site of breast      (Malignant neoplasm of right breast in female, estrogen receptor positive, unspecified site of breast [C50.911, Z17.0])    Surgeons: Jenni Jay MD Provider: Lucio Juarez MD    Anesthesia Type: general ASA Status: 3            Anesthesia Type: general    Vitals  Vitals Value Taken Time   /78 12/03/24 1230   Temp 97.7 °F (36.5 °C) 12/03/24 1214   Pulse 74 12/03/24 1233   Resp 16 12/03/24 1229   SpO2 99 % 12/03/24 1233   Vitals shown include unfiled device data.        Anesthesia Post Evaluation

## 2024-12-06 LAB — REF LAB TEST METHOD: NORMAL

## 2024-12-12 ENCOUNTER — OFFICE VISIT (OUTPATIENT)
Dept: SURGERY | Facility: CLINIC | Age: 69
End: 2024-12-12
Payer: MEDICARE

## 2024-12-12 VITALS
HEART RATE: 70 BPM | BODY MASS INDEX: 23.11 KG/M2 | HEIGHT: 63 IN | DIASTOLIC BLOOD PRESSURE: 58 MMHG | WEIGHT: 130.4 LBS | SYSTOLIC BLOOD PRESSURE: 102 MMHG

## 2024-12-12 DIAGNOSIS — Z09 ENCOUNTER FOR FOLLOW-UP: ICD-10-CM

## 2024-12-12 DIAGNOSIS — Z17.0 MALIGNANT NEOPLASM OF LOWER-OUTER QUADRANT OF RIGHT BREAST OF FEMALE, ESTROGEN RECEPTOR POSITIVE: Primary | ICD-10-CM

## 2024-12-12 DIAGNOSIS — C50.511 MALIGNANT NEOPLASM OF LOWER-OUTER QUADRANT OF RIGHT BREAST OF FEMALE, ESTROGEN RECEPTOR POSITIVE: Primary | ICD-10-CM

## 2024-12-12 NOTE — PROGRESS NOTES
"Subjective   Ela Loera is a 69 y.o. female here today for post op.  History of Present Illness  Ms. Loera was seen in the office today for her first post op visit following a right lumpectomy and excision of 2 right axillary lymph nodes on 12/3/2024.  The patient has had a prior lumpectomy and complete axillary node dissection over 20 years ago.  Pathology demonstrated no evidence of malignancy in the lymph node and 10 mm invasive ductal carcinoma, grade 2.  There was some cautery effect at the lateral margin making this margin difficult to evaluate but comment from pathology stated that benign etiology was favored.  Patient states she had some swelling in the axilla but this has improved.  No Known Allergies      Current Outpatient Medications   Medication Sig Dispense Refill    alendronate (FOSAMAX) 70 MG tablet ON HOLD FOR DENTAL PROCEDURE      Calcium Carbonate Antacid (TUMS PO) Take 2 tablets by mouth Daily.      CRANBERRY FRUIT CONCENTRATE PO Take  by mouth.      Ergocalciferol (VITAMIN D2 PO) Take 1.25 mg by mouth 1 (One) Time Per Week.      Lutein-Zeaxanthin 25-5 MG capsule       multivitamin with minerals (MULTIVITAMIN ADULTS PO) Take 1 tablet by mouth Daily.      valACYclovir (VALTREX) 1000 MG tablet Take 1 tablet by mouth 3 (Three) Times a Day.      ibuprofen (ADVIL,MOTRIN) 800 MG tablet        No current facility-administered medications for this visit.       Objective   /58 (BP Location: Left arm)   Pulse 70   Ht 160 cm (63\")   Wt 59.1 kg (130 lb 6.4 oz)   BMI 23.10 kg/m²    Physical Exam  Well-developed well-nourished female  Right breast:  small scar in the right axilla with some mild postop change.  Scar in the right breast lateral 10:00 with mild postop change.  Results/Data  Pathology result was reviewed and discussed extensively with the patient    Procedures     Assessment & Plan   Right breast T1b N0 invasive ductal carcinoma, ER positive, PA positive, HER2 " negative  Osteopenia    Plan: Right mammogram in 3 months with return to the office following  Repeat bone density scheduled for 12/23/24  Restart Fosamax       Discussion/Summary: Will hold off on starting aromatase inhibitor until new bone density done on 12/23.  I discussed the issue of the indeterminant lateral margin with the patient I explained to her the issues with accurate determination of margin status and the fact that no residual disease is identified in at least 50% of margin re-excisions.  My feeling is that the disease will be well-controlled with an AI but if she has any concerns about this then focal margin reexcision could be done.  Patient states that she is comfortable with initiation of AI and close follow-up    BMI is within normal parameters. No other follow-up for BMI required.       Future Appointments   Date Time Provider Department Center   12/23/2024  8:00 AM [facility] DEXA 1  [facility]         This document has been electronically signed by Rhonda Chaudhary MA   December 12, 2024 09:02 EST      Please note that portions of this note were completed with a voice recognition program.

## 2024-12-23 ENCOUNTER — HOSPITAL ENCOUNTER (OUTPATIENT)
Dept: BONE DENSITY | Facility: HOSPITAL | Age: 69
Discharge: HOME | End: 2024-12-23
Admitting: INTERNAL MEDICINE
Payer: MEDICARE

## 2024-12-23 DIAGNOSIS — Z78.0 ASYMPTOMATIC MENOPAUSAL STATE: ICD-10-CM

## 2024-12-23 PROCEDURE — 77080 DXA BONE DENSITY AXIAL: CPT | Performed by: RADIOLOGY

## 2024-12-23 PROCEDURE — 77080 DXA BONE DENSITY AXIAL: CPT

## 2024-12-26 ENCOUNTER — TELEPHONE (OUTPATIENT)
Dept: SURGERY | Facility: CLINIC | Age: 69
End: 2024-12-26
Payer: MEDICARE

## 2024-12-26 NOTE — TELEPHONE ENCOUNTER
Informed patient that her DEXA did show osteopenia and she should keep taking her Fosamax once a week. Also Dr. Jay has sent in her Anastrozole to take once a day. She is to call if she needs anything.

## 2025-01-17 PROBLEM — M81.0 AGE-RELATED OSTEOPOROSIS WITHOUT CURRENT PATHOLOGICAL FRACTURE: Status: ACTIVE | Noted: 2025-01-17

## 2025-01-27 ENCOUNTER — OFFICE VISIT (OUTPATIENT)
Dept: SURGERY | Facility: CLINIC | Age: 70
End: 2025-01-27
Payer: MEDICARE

## 2025-01-27 VITALS — HEIGHT: 63 IN | WEIGHT: 127 LBS | BODY MASS INDEX: 22.5 KG/M2

## 2025-01-27 DIAGNOSIS — C50.511 MALIGNANT NEOPLASM OF LOWER-OUTER QUADRANT OF RIGHT BREAST OF FEMALE, ESTROGEN RECEPTOR POSITIVE: Primary | ICD-10-CM

## 2025-01-27 DIAGNOSIS — Z09 ENCOUNTER FOR FOLLOW-UP: ICD-10-CM

## 2025-01-27 DIAGNOSIS — Z17.0 MALIGNANT NEOPLASM OF LOWER-OUTER QUADRANT OF RIGHT BREAST OF FEMALE, ESTROGEN RECEPTOR POSITIVE: Primary | ICD-10-CM

## 2025-01-27 PROCEDURE — 1159F MED LIST DOCD IN RCRD: CPT | Performed by: SURGERY

## 2025-01-27 PROCEDURE — 1160F RVW MEDS BY RX/DR IN RCRD: CPT | Performed by: SURGERY

## 2025-01-27 PROCEDURE — 99024 POSTOP FOLLOW-UP VISIT: CPT | Performed by: SURGERY

## 2025-01-27 NOTE — PROGRESS NOTES
Subjective   Ela Loera is a 69 y.o. female her today to ask question before getting injection.    History of Present Illness  Ms Loera was seen in the office today for breast cancer follow-up.  The patient is status post a right lumpectomy and excision of 2 right axillary lymph nodes on 12/3/2024.  The patient has had a prior lumpectomy and complete axillary node dissection over 20 years ago.  Pathology demonstrated no evidence of malignancy in the lymph node and 10 mm invasive ductal carcinoma, grade 2.  There was some cautery effect at the lateral margin making this margin difficult to evaluate but comment from pathology stated that benign etiology was favored.   The patient was started on anastrozole.  She had a bone density on 12/23/2024 which demonstrated worsening of her osteoporosis with a T-score of -2.6.  Based on that it was suggested that the patient switch from which she had been on for years and tolerated to Prolia.  Patient comes in today to discuss some concerns she has about Prolia.  No Known Allergies    Current Outpatient Medications   Medication Sig Dispense Refill    alendronate (FOSAMAX) 70 MG tablet ON HOLD FOR DENTAL PROCEDURE      anastrozole (ARIMIDEX) 1 MG tablet Take 1 tablet by mouth Daily for 360 days. 30 tablet 11    Calcium Carbonate Antacid (TUMS PO) Take 2 tablets by mouth Daily.      CRANBERRY FRUIT CONCENTRATE PO Take  by mouth.      Ergocalciferol (VITAMIN D2 PO) Take 1.25 mg by mouth 1 (One) Time Per Week.      ibuprofen (ADVIL,MOTRIN) 800 MG tablet       Lutein-Zeaxanthin 25-5 MG capsule       multivitamin with minerals (MULTIVITAMIN ADULTS PO) Take 1 tablet by mouth Daily.      valACYclovir (VALTREX) 1000 MG tablet Take 1 tablet by mouth 3 (Three) Times a Day.       No current facility-administered medications for this visit.     Past Medical History:   Diagnosis Date    Arthritis     Breast cancer 1986    Eye disease     Osteopenia      Past Surgical History:  "  Procedure Laterality Date    AXILLARY LYMPH NODE BIOPSY/EXCISION Right 12/3/2024    Procedure: AXILLARY LYMPH NODE BIOPSY/EXCISION with frozen section.;  Surgeon: Jenni Jay MD;  Location: [facility] OR;  Service: General;  Laterality: Right;    BREAST LUMPECTOMY Right 10/1986    cancer    BREAST LUMPECTOMY Right 12/3/2024    Procedure: right breast lumpectomy.;  Surgeon: Jenni Jay MD;  Location: [facility] OR;  Service: General;  Laterality: Right;    CATARACT EXTRACTION      COLONOSCOPY      LAPAROSCOPIC TUBAL LIGATION      NOSE SURGERY      RETINAL DETACHMENT REPAIR Right     VITRECTOMY Bilateral     twice on each eye       Pertinent Review of Systems      Objective   Ht 160 cm (63\")   Wt 57.6 kg (127 lb)   BMI 22.50 kg/m²    Physical Exam    Procedures     Results/Data:      Assessment & Plan   Right breast T1b N0 invasive ductal carcinoma, ER positive, DC positive, HER2 negative  Osteopenia    Plan: Switch to tamoxifen  New right baseline mammogram in March, 2025 with return to the office following       Discussion/Summary: We had an extensive discussion about the pros and cons of Prolia versus Fosamax and anastrozole versus tamoxifen.  I told the patient that I typically did not see dramatic improvement in bone density with either Fosamax or Prolia.  I actually tried to look online to see if 1 had a superior benefit and I was not able to clearly find this information on a short review.  The patient does have a lot of concerns about her bone health and has a strong family history of osteoporosis.  We discussed that in terms of the potential detriment to bone health that anastrozole was worse than tamoxifen while it might be slightly superior for the breast.  After discussion of the issues the patient has decided to stay on her Fosamax for now and forego the Prolia injection and prescription for tamoxifen was sent in.    Time spent:     BMI is within normal parameters. No other follow-up for BMI " required.       @[facility]    This document has been electronically signed by        January 27, 2025 15:14 EST    Please note that portions of this note were completed with a voice recognition program.

## 2025-01-29 RX ORDER — TAMOXIFEN CITRATE 20 MG/1
20 TABLET ORAL DAILY
Qty: 90 TABLET | Refills: 3 | Status: SHIPPED | OUTPATIENT
Start: 2025-01-29 | End: 2026-01-24

## 2025-03-20 ENCOUNTER — HOSPITAL ENCOUNTER (OUTPATIENT)
Dept: MAMMOGRAPHY | Facility: HOSPITAL | Age: 70
Discharge: HOME | End: 2025-03-20
Admitting: SURGERY
Payer: MEDICARE

## 2025-03-20 DIAGNOSIS — C50.511 MALIGNANT NEOPLASM OF LOWER-OUTER QUADRANT OF RIGHT BREAST OF FEMALE, ESTROGEN RECEPTOR POSITIVE: ICD-10-CM

## 2025-03-20 DIAGNOSIS — Z17.0 MALIGNANT NEOPLASM OF LOWER-OUTER QUADRANT OF RIGHT BREAST OF FEMALE, ESTROGEN RECEPTOR POSITIVE: ICD-10-CM

## 2025-03-20 PROCEDURE — 77065 DX MAMMO INCL CAD UNI: CPT

## 2025-03-20 PROCEDURE — G0279 TOMOSYNTHESIS, MAMMO: HCPCS

## 2025-04-10 ENCOUNTER — OFFICE VISIT (OUTPATIENT)
Dept: SURGERY | Facility: CLINIC | Age: 70
End: 2025-04-10
Payer: MEDICARE

## 2025-04-10 VITALS
DIASTOLIC BLOOD PRESSURE: 62 MMHG | HEART RATE: 70 BPM | BODY MASS INDEX: 23.21 KG/M2 | SYSTOLIC BLOOD PRESSURE: 110 MMHG | WEIGHT: 131 LBS | HEIGHT: 63 IN

## 2025-04-10 DIAGNOSIS — Z17.0 MALIGNANT NEOPLASM OF LOWER-OUTER QUADRANT OF RIGHT BREAST OF FEMALE, ESTROGEN RECEPTOR POSITIVE: Primary | ICD-10-CM

## 2025-04-10 DIAGNOSIS — C50.511 MALIGNANT NEOPLASM OF LOWER-OUTER QUADRANT OF RIGHT BREAST OF FEMALE, ESTROGEN RECEPTOR POSITIVE: Primary | ICD-10-CM

## 2025-04-10 NOTE — PROGRESS NOTES
Subjective   Ela Loera is a 69 y.o. female here today for mammogram follow up.    History of Present Illness  Ms Loera was seen in the office today for breast cancer follow-up.  The patient is status post a right lumpectomy and excision of 2 right axillary lymph nodes on 12/3/2024.  The patient has had a prior lumpectomy and complete axillary node dissection over 20 years ago.  Pathology demonstrated no evidence of malignancy in the lymph node and 10 mm invasive ductal carcinoma, grade 2.  There was some cautery effect at the lateral margin making this margin difficult to evaluate but comment from pathology stated that benign etiology was favored.   The patient was started on anastrozole but was switched to tamoxifen at the time of her last visit.  She had a bone density on 12/23/2024 which demonstrated worsening of her osteoporosis with a T-score of -2.6.  Sofia had a right mammogram on 3/20/2025 which demonstrated presumed postop changes in the right breast for which a bilateral 6-month follow-up was recommended.  Patient denies any changes in her breast examination.  No palpable mass or adenopathy.  No symptoms of metastatic disease  No Known Allergies    Current Outpatient Medications   Medication Sig Dispense Refill    alendronate (FOSAMAX) 70 MG tablet ON HOLD FOR DENTAL PROCEDURE      Calcium Carbonate Antacid (TUMS PO) Take 2 tablets by mouth Daily.      CRANBERRY FRUIT CONCENTRATE PO Take  by mouth.      Ergocalciferol (VITAMIN D2 PO) Take 1.25 mg by mouth 1 (One) Time Per Week.      ibuprofen (ADVIL,MOTRIN) 800 MG tablet       Lutein-Zeaxanthin 25-5 MG capsule       multivitamin with minerals (MULTIVITAMIN ADULTS PO) Take 1 tablet by mouth Daily.      tamoxifen (NOLVADEX) 20 MG chemo tablet Take 1 tablet by mouth Daily for 360 days. 90 tablet 3    valACYclovir (VALTREX) 1000 MG tablet Take 1 tablet by mouth 3 (Three) Times a Day.       No current facility-administered medications for this visit.  "    Past Medical History:   Diagnosis Date    Arthritis     Breast cancer 1986    Eye disease     Osteopenia      Past Surgical History:   Procedure Laterality Date    AXILLARY LYMPH NODE BIOPSY/EXCISION Right 12/3/2024    Procedure: AXILLARY LYMPH NODE BIOPSY/EXCISION with frozen section.;  Surgeon: Jenni Jay MD;  [facility];  Service: General;  Laterality: Right;    BREAST LUMPECTOMY Right 10/1986    cancer    BREAST LUMPECTOMY Right 12/3/2024    Procedure: right breast lumpectomy.;  Surgeon: Jenni Jay MD;  Location: [facility] OR;  Service: General;  Laterality: Right;    CATARACT EXTRACTION      COLONOSCOPY      LAPAROSCOPIC TUBAL LIGATION      NOSE SURGERY      RETINAL DETACHMENT REPAIR Right     VITRECTOMY Bilateral     twice on each eye       Pertinent Review of Systems  Intermittent left neck pain  No vaginal discharge    Objective   /62 (BP Location: Left arm)   Pulse 70   Ht 160 cm (63\")   Wt 59.4 kg (131 lb)   BMI 23.21 kg/m²    Physical Exam  Well-developed well-nourished female  Neck:  No supraclavicular adenopathy  Lungs:  Respiratory effort normal. Auscultation: Clear, without wheezes, rhonchi, rales  Heart:  Regular rate and rhythm, without murmur, gallop, rub.  No pedal edema  Breasts: On visual inspection the breasts are symmetrical.  Examination of the right breast demonstrates a surgical scar laterally at 9:00.  No palpable mass or adenopathy.  Tissue is dense..  Examination of the left breast demonstrates dense tissue, without mass or adenopathy.     Procedures   L-Dex® Analysis for Lymphedema  The patient had a SOZO measurement which I reviewed today. The score is   2.9, see scanned to EMR. Bioimpedance spectroscopy helps identify the   onset of lymphedema in an arm or leg before patients experience noticeable swelling. Research has   shown that 92% of patients with early detection of lymphedema using L-Dex combined with   intervention do not progress to chronic " lymphedema through three years. Additionally, as of March 2023, the NCCN Guidelines® for Survivorship recommend proactive screening for lymphedema using   bioimpedance spectroscopy. Whenever possible, patients are tested for baseline L-Dex score before   cancer treatment begins and then are reassessed during regular follow-up visits using the SOZO device.   Otherwise, this can be started postoperatively and continued during regular follow-up visits. If the   patient's L-Dex score increases above normal levels, that is a sign that lymphedema is developing and a   referral is made to physical therapy for further evaluation and early compression treatment.   Lymphedema assessment with the SOZO L-Dex score is recommended to be done every 3 months for   the first 3 years and then every 6 months for years 4 and 5 followed by annually afterwards  Results/Data:  Imaging: Mammogram report and images from 3/20/2025 were reviewed.  I agree with the assessment      Assessment & Plan   Right breast T1b N0 invasive ductal carcinoma, ER positive, CA positive, HER2 negative  Osteopenia    Plan: Continue tamoxifen  Continue Fosamax, calcium and vitamin D  Bilateral mammogram in September, 2025 with return to the office following.  The patient has not had a Pap smear prior to next visit it will be performed with her next office visit.       Discussion/Summary:    Time spent:     BMI is within normal parameters. No other follow-up for BMI required.       @[facility]    This document has been electronically signed by        April 10, 2025 09:10 EDT    Please note that portions of this note were completed with a voice recognition program.

## (undated) DEVICE — SUT VIC 4/0 P3 18IN J494G

## (undated) DEVICE — JACKSON-PRATT 100CC BULB RESERVOIR: Brand: CARDINAL HEALTH

## (undated) DEVICE — PK BASIC 70

## (undated) DEVICE — HARMONIC FOCUS SHEARS 9CM LENGTH + ADAPTIVE TISSUE TECHNOLOGY FOR USE WITH BLUE HAND PIECE ONLY: Brand: HARMONIC FOCUS

## (undated) DEVICE — CVR PROB ULTRASND CIVFLEX GEN/PURP TELESCP/FOLD 5.5X58IN LF

## (undated) DEVICE — SHEET,DRAPE,53X77,STERILE: Brand: MEDLINE

## (undated) DEVICE — KT INK TISS MARGINMARKER STD 6COLOR

## (undated) DEVICE — Device

## (undated) DEVICE — CAMISOLE MASTCTMY W/ DRN MGMT LG BGE

## (undated) DEVICE — SUT SILK 2/0 FS BLK 18IN 685G

## (undated) DEVICE — PROBE COVER 5" X 48": Brand: STERIMED

## (undated) DEVICE — STCKNT IMPERV 9X36IN STRL

## (undated) DEVICE — ELECTRD BLD EZ CLN MOD 4IN

## (undated) DEVICE — SUT MNCRYL PLS ANTIB UD 4/0 PS2 18IN

## (undated) DEVICE — BNDG ELAS CO-FLEX SLF ADHR 4IN5YD LF STRL

## (undated) DEVICE — PATIENT RETURN ELECTRODE, SINGLE-USE, CONTACT QUALITY MONITORING, ADULT, WITH 9FT CORD, FOR PATIENTS WEIGING OVER 33LBS. (15KG): Brand: MEGADYNE

## (undated) DEVICE — HOLDER: Brand: DEROYAL

## (undated) DEVICE — INTENDED FOR TISSUE SEPARATION, AND OTHER PROCEDURES THAT REQUIRE A SHARP SURGICAL BLADE TO PUNCTURE OR CUT.: Brand: BARD-PARKER ® STAINLESS STEEL BLADES

## (undated) DEVICE — VIOLET BRAIDED (POLYGLACTIN 910), SYNTHETIC ABSORBABLE SUTURE: Brand: COATED VICRYL

## (undated) DEVICE — SYR CONTRL LUERLOK 10CC

## (undated) DEVICE — ELECTRD NDL EZ CLN MOD 2.75IN

## (undated) DEVICE — DRSNG WND SIL OPTIFOAM GENTLE BRDR ADHS W/SA 4X4IN

## (undated) DEVICE — PROXIMATE RH ROTATING HEAD SKIN STAPLERS (35 WIDE) CONTAINS 35 STAINLESS STEEL STAPLES: Brand: PROXIMATE

## (undated) DEVICE — SUT SILK 2/0 SH 30IN K833H

## (undated) DEVICE — MARKER,SKIN,WI/RULER AND LABELS: Brand: MEDLINE

## (undated) DEVICE — CONTAINER,SPECIMEN,OR STERILE,4OZ: Brand: MEDLINE

## (undated) DEVICE — DRAPE,T,LAPARO,TRANS,STERILE: Brand: MEDLINE

## (undated) DEVICE — BRA RADITON/THERP THERAPORT SER2161 FRNT/CLS HK/LP LG WHT

## (undated) DEVICE — SUT SILK 2/0 TIES 30IN SA85H

## (undated) DEVICE — ADHS LIQ MASTISOL 2/3ML

## (undated) DEVICE — DBD-DRAPE,LAP,CHOLE,W/TROUGHS,STERILE: Brand: MEDLINE

## (undated) DEVICE — GLV SURG PREMIERPRO MIC LTX PF SZ7 BRN

## (undated) DEVICE — DRN WND HUBLSS FLUT FULL PERF SIL10MM

## (undated) DEVICE — SPNG LAP PREWSH SFTPK 18X18IN STRL PK/5

## (undated) DEVICE — SUT VIC 3/0 SH 27IN J416H

## (undated) DEVICE — STRIP,CLOSURE,WOUND,MEDI-STRIP,1/2X4: Brand: MEDLINE

## (undated) DEVICE — ROSEBUD DISSECTORS: Brand: DEROYAL